# Patient Record
Sex: FEMALE | Race: BLACK OR AFRICAN AMERICAN | Employment: UNEMPLOYED | URBAN - METROPOLITAN AREA
[De-identification: names, ages, dates, MRNs, and addresses within clinical notes are randomized per-mention and may not be internally consistent; named-entity substitution may affect disease eponyms.]

---

## 2018-05-08 ENCOUNTER — TELEPHONE (OUTPATIENT)
Dept: PAIN MANAGEMENT | Age: 63
End: 2018-05-08

## 2018-08-29 ENCOUNTER — TELEPHONE (OUTPATIENT)
Dept: INTERNAL MEDICINE CLINIC | Age: 63
End: 2018-08-29

## 2018-08-29 NOTE — TELEPHONE ENCOUNTER
Pt called and said she can not make it to her new patient appointment today. Advised pt of policy and told her we could schedule her at a different office. Pt stated she has no transportation to any other office and would like to know if she can schedule with this office.  Please call back

## 2018-09-18 ENCOUNTER — OFFICE VISIT (OUTPATIENT)
Dept: PRIMARY CARE CLINIC | Age: 63
End: 2018-09-18

## 2018-09-18 VITALS
OXYGEN SATURATION: 98 % | HEIGHT: 67 IN | BODY MASS INDEX: 41.91 KG/M2 | WEIGHT: 267 LBS | RESPIRATION RATE: 18 BRPM | DIASTOLIC BLOOD PRESSURE: 77 MMHG | SYSTOLIC BLOOD PRESSURE: 140 MMHG | TEMPERATURE: 98 F | HEART RATE: 92 BPM

## 2018-09-18 DIAGNOSIS — I48.91 ATRIAL FIBRILLATION, UNSPECIFIED TYPE (HCC): ICD-10-CM

## 2018-09-18 DIAGNOSIS — M17.0 PRIMARY OSTEOARTHRITIS OF BOTH KNEES: ICD-10-CM

## 2018-09-18 DIAGNOSIS — G89.29 CHRONIC PAIN OF RIGHT ANKLE: ICD-10-CM

## 2018-09-18 DIAGNOSIS — M25.571 CHRONIC PAIN OF RIGHT ANKLE: ICD-10-CM

## 2018-09-18 DIAGNOSIS — I63.9 CEREBROVASCULAR ACCIDENT (CVA), UNSPECIFIED MECHANISM (HCC): ICD-10-CM

## 2018-09-18 DIAGNOSIS — N32.81 OAB (OVERACTIVE BLADDER): Primary | ICD-10-CM

## 2018-09-18 PROCEDURE — 99204 OFFICE O/P NEW MOD 45 MIN: CPT | Performed by: FAMILY MEDICINE

## 2018-09-18 RX ORDER — GABAPENTIN 300 MG/1
300 CAPSULE ORAL 3 TIMES DAILY
Qty: 90 CAPSULE | Refills: 5 | Status: SHIPPED | OUTPATIENT
Start: 2018-09-18 | End: 2019-04-23 | Stop reason: SDUPTHER

## 2018-09-18 RX ORDER — DILTIAZEM HYDROCHLORIDE 240 MG/1
CAPSULE, EXTENDED RELEASE ORAL
Refills: 3 | COMMUNITY
Start: 2018-08-02

## 2018-09-18 RX ORDER — DULOXETIN HYDROCHLORIDE 20 MG/1
20 CAPSULE, DELAYED RELEASE ORAL
COMMUNITY
Start: 2017-07-21

## 2018-09-18 RX ORDER — GABAPENTIN 300 MG/1
CAPSULE ORAL
COMMUNITY
Start: 2017-08-21 | End: 2018-09-18 | Stop reason: SDUPTHER

## 2018-09-18 RX ORDER — FUROSEMIDE 20 MG/1
20 TABLET ORAL
COMMUNITY

## 2018-09-18 RX ORDER — SOLIFENACIN SUCCINATE 5 MG/1
5 TABLET, FILM COATED ORAL DAILY
Qty: 30 TABLET | Refills: 3 | Status: SHIPPED | OUTPATIENT
Start: 2018-09-18 | End: 2019-02-01 | Stop reason: SDUPTHER

## 2018-09-18 RX ORDER — TRAMADOL HYDROCHLORIDE 50 MG/1
50 TABLET ORAL EVERY 6 HOURS PRN
COMMUNITY

## 2018-09-18 RX ORDER — BLOOD PRESSURE TEST KIT
KIT MISCELLANEOUS
COMMUNITY
Start: 2016-07-01

## 2018-09-18 RX ORDER — GABAPENTIN 300 MG/1
CAPSULE ORAL
Refills: 3 | COMMUNITY
Start: 2018-08-02 | End: 2018-09-18 | Stop reason: SDUPTHER

## 2018-09-18 RX ORDER — POTASSIUM CHLORIDE 600 MG/1
TABLET, FILM COATED, EXTENDED RELEASE ORAL
COMMUNITY
Start: 2018-08-10

## 2018-09-18 ASSESSMENT — ENCOUNTER SYMPTOMS
SWOLLEN GLANDS: 0
EYE REDNESS: 0
EYE DISCHARGE: 0
VOMITING: 0
ABDOMINAL DISTENTION: 0
COUGH: 0
SORE THROAT: 0
BLOOD IN STOOL: 0
SINUS PRESSURE: 0
TROUBLE SWALLOWING: 0
EYE ITCHING: 0
WHEEZING: 0
ABDOMINAL PAIN: 0
DIARRHEA: 0
CHEST TIGHTNESS: 0
STRIDOR: 0
VISUAL CHANGE: 0
CONSTIPATION: 0
APNEA: 0
RECTAL PAIN: 0
COLOR CHANGE: 0
CHOKING: 0
PHOTOPHOBIA: 0
BACK PAIN: 0
EYE PAIN: 0
SHORTNESS OF BREATH: 0
NAUSEA: 0
RHINORRHEA: 0
CHANGE IN BOWEL HABIT: 0

## 2018-09-18 ASSESSMENT — PATIENT HEALTH QUESTIONNAIRE - PHQ9
1. LITTLE INTEREST OR PLEASURE IN DOING THINGS: 0
2. FEELING DOWN, DEPRESSED OR HOPELESS: 0
SUM OF ALL RESPONSES TO PHQ9 QUESTIONS 1 & 2: 0
SUM OF ALL RESPONSES TO PHQ QUESTIONS 1-9: 0
SUM OF ALL RESPONSES TO PHQ QUESTIONS 1-9: 0

## 2018-09-18 NOTE — PROGRESS NOTES
Subjective:      Patient ID: Anna Mckeon is a 58 y.o. female. Knee pain  Atrial fib    Knee Pain    Incident onset: chronic knee pain R > L for 7 yrs, not getting better, Dr Sydni Jordan, ortho recommended replacement, pt not ready. uses wheelchair. some ankle pain -leg pain alson. There was no injury mechanism. The pain is at a severity of 10/10. The pain is severe. The pain has been constant since onset. Associated symptoms include an inability to bear weight. Pertinent negatives include no numbness. The symptoms are aggravated by movement. The treatment provided no relief. Other   Chronicity: c/o oab, have accidents, cannot control urine, present for 3 months,  not getting better. no dysuria. frequency of urination. The problem occurs constantly. The problem has been gradually worsening. Pertinent negatives include no abdominal pain, anorexia, arthralgias, change in bowel habit, chest pain, chills, congestion, coughing, diaphoresis, fatigue, fever, headaches, joint swelling, myalgias, nausea, neck pain, numbness, rash, sore throat, swollen glands, urinary symptoms, vertigo, visual change, vomiting or weakness. Nothing aggravates the symptoms. She has tried nothing for the symptoms. The treatment provided no relief. she has been on xarelto per Dr Mandeep Pereyra, card at [facility] for > 3 years  No palpitations    Past Medical History:   Diagnosis Date    Allergic rhinitis     OAB (overactive bladder)     Osteoarthritis     Urinary incontinence      Social History     Social History    Marital status: Single     Spouse name: N/A    Number of children: N/A    Years of education: N/A     Occupational History    Not on file. Social History Main Topics    Smoking status: Never Smoker    Smokeless tobacco: Never Used    Alcohol use Yes    Drug use: Yes     Types: Marijuana    Sexual activity: No     Other Topics Concern    Not on file     Social History Narrative    No narrative on file     History reviewed. No pertinent surgical history. Social History     Social History    Marital status: Single     Spouse name: N/A    Number of children: N/A    Years of education: N/A     Occupational History    Not on file. Social History Main Topics    Smoking status: Never Smoker    Smokeless tobacco: Never Used    Alcohol use Yes    Drug use: Yes     Types: Marijuana    Sexual activity: No     Other Topics Concern    Not on file     Social History Narrative    No narrative on file     Family History   Problem Relation Age of Onset    Heart Disease Mother     Cancer Father        Current Outpatient Prescriptions   Medication Sig Dispense Refill    DULoxetine (CYMBALTA) 20 MG extended release capsule Take 20 mg by mouth      furosemide (LASIX) 20 MG tablet Take 20 mg by mouth      potassium chloride (KLOR-CON) 8 MEQ extended release tablet TAKE 1 TAB BY MOUTH DAILY.  rivaroxaban (XARELTO) 20 MG TABS tablet Take 20 mg by mouth      gabapentin (NEURONTIN) 300 MG capsule Take by mouth. [email or web address] Blood Pressure Monitor KIT Test as directed      CARTIA  MG extended release capsule TK 1 C PO D  3    traMADol (ULTRAM) 50 MG tablet Take 50 mg by mouth every 6 hours as needed for Pain. [email or web address] diclofenac sodium (VOLTAREN) 1 % GEL Apply 4 g topically 4 times daily 5 Tube 0    gabapentin (NEURONTIN) 300 MG capsule TK 1 C PO TID  3     No current facility-administered medications for this visit. Review of Systems   Constitutional: Negative for activity change, appetite change, chills, diaphoresis, fatigue and fever. HENT: Negative for congestion, dental problem, drooling, ear discharge, ear pain, hearing loss, mouth sores, nosebleeds, postnasal drip, rhinorrhea, sinus pressure, sneezing, sore throat, tinnitus and trouble swallowing. Eyes: Negative for photophobia, pain, discharge, redness, itching and visual disturbance.    Respiratory: Negative for apnea, cough, choking, chest tightness, shortness of breath, wheezing and stridor. Cardiovascular: Negative for chest pain, palpitations and leg swelling. Gastrointestinal: Negative for abdominal distention, abdominal pain, anorexia, blood in stool, change in bowel habit, constipation, diarrhea, nausea, rectal pain and vomiting. Genitourinary: Negative for decreased urine volume, difficulty urinating, dyspareunia, dysuria, enuresis, flank pain, frequency, genital sores, hematuria, menstrual problem, pelvic pain, urgency, vaginal bleeding and vaginal pain. Musculoskeletal: Negative for arthralgias, back pain, gait problem, joint swelling, myalgias, neck pain and neck stiffness. Bilat knee  & R ankle pain   Skin: Negative for color change, pallor, rash and wound. Neurological: Negative for dizziness, vertigo, tremors, seizures, syncope, facial asymmetry, speech difficulty, weakness, light-headedness, numbness and headaches. Hematological: Negative for adenopathy. Does not bruise/bleed easily. Psychiatric/Behavioral: Negative for agitation, behavioral problems, confusion, decreased concentration, dysphoric mood, hallucinations, self-injury, sleep disturbance and suicidal ideas. The patient is not nervous/anxious and is not hyperactive. Objective:   Physical Exam   Constitutional: She is oriented to person, place, and time. She appears well-developed and well-nourished. No distress. HENT:   Head: Normocephalic and atraumatic. Right Ear: External ear normal.   Left Ear: External ear normal.   Nose: Nose normal.   Mouth/Throat: Oropharynx is clear and moist. No oropharyngeal exudate. Eyes: Pupils are equal, round, and reactive to light. Conjunctivae and EOM are normal. Left eye exhibits no discharge. No scleral icterus. Neck: Normal range of motion. Neck supple. No JVD present. No tracheal deviation present. No thyromegaly present. Cardiovascular: Normal rate, normal heart sounds and intact distal pulses.   Exam reveals no gallop and no friction rub. No murmur heard. irregular   Pulmonary/Chest: Effort normal and breath sounds normal. No stridor. No respiratory distress. She has no wheezes. She has no rales. She exhibits no tenderness. Abdominal: Soft. Bowel sounds are normal. She exhibits no distension and no mass. There is no tenderness. There is no rebound and no guarding. Musculoskeletal: Normal range of motion. She exhibits no edema or tenderness. Knee deformity R>L   Lymphadenopathy:     She has no cervical adenopathy. Neurological: She is alert and oriented to person, place, and time. She has normal reflexes. No cranial nerve deficit. Coordination normal.   Skin: Skin is warm and dry. No rash noted. She is not diaphoretic. No erythema. No pallor. Psychiatric: She has a normal mood and affect. Her behavior is normal. Judgment and thought content normal.   Nursing note and vitals reviewed. Assessment:      1. Primary osteoarthritis of both knees  Acetaminophen otc  add  Diclofenac gel  - XR KNEE RIGHT (3 VIEWS); Future  - XR KNEE LEFT (3 VIEWS); Future  - URIC ACID; Future  - SEDIMENTATION RATE; Future  gabapentin  2. OAB (overactive bladder)  Add versucare  - Urinalysis; Future  - Urine Culture; Future    3. Atrial fibrillation, unspecified type (HCC)  On xarelto  cbc  ekg  4. Chronic pain of right ankle  - XR ANKLE RIGHT (2 VIEWS); Future  - XR ANKLE RIGHT (2 VIEWS); Future  - URIC ACID; Future  - SEDIMENTATION RATE; Future    5. Old cva  July 2016 with weakness R side, resolved  Was hosp .  At [facility] [place] [facility] AT [facility]      Plan:            See me  In 2 weeks  Chel Smith MD

## 2018-09-27 ENCOUNTER — TELEPHONE (OUTPATIENT)
Dept: PRIMARY CARE CLINIC | Age: 63
End: 2018-09-27

## 2019-02-01 DIAGNOSIS — N32.81 OAB (OVERACTIVE BLADDER): ICD-10-CM

## 2019-02-02 RX ORDER — SOLIFENACIN SUCCINATE 5 MG/1
TABLET, FILM COATED ORAL
Qty: 30 TABLET | Refills: 3 | Status: SHIPPED | OUTPATIENT
Start: 2019-02-02

## 2019-04-23 DIAGNOSIS — M25.571 CHRONIC PAIN OF RIGHT ANKLE: ICD-10-CM

## 2019-04-23 DIAGNOSIS — G89.29 CHRONIC PAIN OF RIGHT ANKLE: ICD-10-CM

## 2019-04-25 RX ORDER — GABAPENTIN 300 MG/1
CAPSULE ORAL
Qty: 90 CAPSULE | Refills: 5 | Status: SHIPPED | OUTPATIENT
Start: 2019-04-25 | End: 2019-05-25